# Patient Record
Sex: MALE | Race: WHITE
[De-identification: names, ages, dates, MRNs, and addresses within clinical notes are randomized per-mention and may not be internally consistent; named-entity substitution may affect disease eponyms.]

---

## 2019-11-04 ENCOUNTER — HOSPITAL ENCOUNTER (OUTPATIENT)
Dept: HOSPITAL 50 - VM.SDS | Age: 59
Discharge: HOME | End: 2019-11-04
Attending: SURGERY
Payer: COMMERCIAL

## 2019-11-04 DIAGNOSIS — Z79.899: ICD-10-CM

## 2019-11-04 DIAGNOSIS — Z12.11: Primary | ICD-10-CM

## 2019-11-04 DIAGNOSIS — M17.10: ICD-10-CM

## 2019-11-04 DIAGNOSIS — K64.8: ICD-10-CM

## 2019-11-04 DIAGNOSIS — M19.042: ICD-10-CM

## 2019-11-04 DIAGNOSIS — M19.041: ICD-10-CM

## 2019-11-04 NOTE — OR
PREOPERATIVE DIAGNOSIS:  Screening colonoscopy.

 

POSTOPERATIVE DIAGNOSIS:  Screening colonoscopy.

 

PROCEDURE PERFORMED:  Screening colonoscopy.

 

INDICATIONS:  Mr. Mishra is a 59-year-old male who presents for screening

colonoscopy.  He is 10 years out from his last colonoscopy.

 

PROCEDURE IN DETAIL:  This was done in the procedure room.  He was placed in

left lateral position.  First, a rectal exam was done, was normal.  Scope was

slowly introduced into the rectum and slowly advanced to the rectum, sigmoid,

descending, transverse, and ascending colon until the cecum was reached.  Upon

reaching the cecum, scope was slowly withdrawn looking at all mucosal surfaces

on the way out.  No mucosal abnormalities or lesions were noted.  He did have

moderate internal hemorrhoids.

 

FINAL DIAGNOSIS:  Hemorrhoids.

 

 

BKD:  11/04/2019 12:38:47  MODL:  11/04/2019 12:57:00

Job #:  [id number]

 

CC:   [place], [place]